# Patient Record
Sex: MALE | Race: WHITE | NOT HISPANIC OR LATINO | Employment: FULL TIME | URBAN - METROPOLITAN AREA
[De-identification: names, ages, dates, MRNs, and addresses within clinical notes are randomized per-mention and may not be internally consistent; named-entity substitution may affect disease eponyms.]

---

## 2024-01-14 ENCOUNTER — ANCILLARY PROCEDURE (OUTPATIENT)
Dept: RADIOLOGY | Facility: CLINIC | Age: 49
End: 2024-01-14
Payer: COMMERCIAL

## 2024-01-14 DIAGNOSIS — R52 PAIN: ICD-10-CM

## 2024-01-14 PROCEDURE — 72110 X-RAY EXAM L-2 SPINE 4/>VWS: CPT | Performed by: RADIOLOGY

## 2024-01-14 PROCEDURE — 72110 X-RAY EXAM L-2 SPINE 4/>VWS: CPT

## 2024-10-20 ENCOUNTER — OFFICE VISIT (OUTPATIENT)
Dept: URGENT CARE | Age: 49
End: 2024-10-20
Payer: COMMERCIAL

## 2024-10-20 VITALS
TEMPERATURE: 98.1 F | OXYGEN SATURATION: 98 % | HEART RATE: 67 BPM | DIASTOLIC BLOOD PRESSURE: 87 MMHG | RESPIRATION RATE: 16 BRPM | SYSTOLIC BLOOD PRESSURE: 130 MMHG

## 2024-10-20 DIAGNOSIS — H18.9 LESION OF CORNEA: Primary | ICD-10-CM

## 2024-10-20 PROCEDURE — 99213 OFFICE O/P EST LOW 20 MIN: CPT | Performed by: PHYSICIAN ASSISTANT

## 2024-10-20 RX ORDER — ESOMEPRAZOLE MAGNESIUM 40 MG/1
40 CAPSULE, DELAYED RELEASE ORAL
COMMUNITY
Start: 2021-03-15

## 2024-10-20 RX ORDER — TOBRAMYCIN 3 MG/ML
2 SOLUTION/ DROPS OPHTHALMIC EVERY 4 HOURS
Qty: 5 ML | Refills: 0 | Status: SHIPPED | OUTPATIENT
Start: 2024-10-20 | End: 2024-10-27

## 2024-10-20 ASSESSMENT — ENCOUNTER SYMPTOMS
EYE PAIN: 1
EYE REDNESS: 1

## 2024-10-20 ASSESSMENT — VISUAL ACUITY: OU: 1

## 2024-10-20 NOTE — LETTER
October 20, 2024     Patient: Ivan Win   YOB: 1975   Date of Visit: 10/20/2024       To Whom It May Concern:    Ivan Win was seen in my clinic on 10/20/2024 at 12:55 pm. Please excuse Ivan for his absence from work on this day to make the appointment. Pt will require outpatient ophthalmology visit. Please excuse absence to attend this appointment.     If you have any questions or concerns, please don't hesitate to call.         Sincerely,         Allison Morin PA-C        CC: No Recipients

## 2024-10-20 NOTE — PROGRESS NOTES
Subjective   Patient ID: Ivan Win is a 49 y.o. male. They present today with a chief complaint of Eye Problem (Burning and pressure of right eye X 3 days).    History of Present Illness  Mr. Win is a 49 year old male presents to  with c/o eye redness and burning pain x 3 days. 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 Denies visual changes. No glasses or contact lens use.           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    Minocycline Rash 11/27/2018    Pseudoephedrine Other 03/15/2021       (Not in a hospital admission)       Past Medical History:   Diagnosis Date    Personal history of diseases of the skin and subcutaneous tissue     History of eczema    Personal history of other diseases of the circulatory system     History of hypertension    Personal history of other endocrine, nutritional and metabolic disease     History of hyperlipidemia    Personal history of other infectious and parasitic diseases     History of chickenpox    Personal history of other mental and behavioral disorders 03/22/2017    History of depression    Personal history of other mental and behavioral disorders     History of anxiety    Personal history of pneumonia (recurrent)     History of pneumonia    Unspecified injury of lower back, initial encounter 05/31/2017    Back injury due to skiing accident       Past Surgical History:   Procedure Laterality Date    OTHER SURGICAL HISTORY  02/14/2019    [place] tooth extraction            Review of Systems  Review of Systems   Eyes:  Positive for pain and redness.                                  Objective    Vitals:    10/20/24 1413   BP: 130/87   Pulse: 67   Resp: 16   Temp: 36.7 °C (98.1 °F)   SpO2: 98%     No LMP for male patient.    Physical Exam  Constitutional:       Appearance: Normal appearance.   HENT:      Head: Normocephalic and atraumatic.   Eyes:       General: Vision grossly intact. Gaze aligned appropriately. No visual field deficit.        Right eye: No foreign body or hordeolum.         Left eye: No foreign body or hordeolum.      Extraocular Movements: Extraocular movements intact.      Right eye: Normal extraocular motion and no nystagmus.      Left eye: Normal extraocular motion and no nystagmus.      Conjunctiva/sclera:      Right eye: Right conjunctiva is injected.        Comments: Dye uptake noted on fluorescein stain.    Neurological:      Mental Status: He is alert.         Procedures    Point of Care Test & Imaging Results from this visit  No results found for this visit on 10/20/24.   No results found.    Diagnostic study results (if any) were reviewed by Allison Morin PA-C.    Assessment/Plan   Allergies, medications, history, and pertinent labs/EKGs/Imaging reviewed by Allison Morin PA-C.     Medical Decision Making    Mr. Win presents with R eye irritation and burning x 3 days. Area of increased dye uptake on florescein stain. Moderate size. Will start tobrex with close outpatient ophthalmology follow up. Recommend he call [facility] if he unable to get appt at . Plan of care discussed with patient and/or family who verbalized understanding. Recommend Follow up with PCP. Advised seeking immediate emergency medical attention if symptoms fail to improve, worsen or any concerning symptoms arise. Patient/Guardian voiced full understanding and agreement to plan.      Orders and Diagnoses  Diagnoses and all orders for this visit:  Lesion of cornea  -     tobramycin (Tobrex) 0.3 % ophthalmic solution; Administer 2 drops into both eyes every 4 hours for 7 days.  -     Referral to Ophthalmology; Future      Medical Admin Record      Patient disposition: Home    Electronically signed by Allison Morin PA-C  2:43 PM